# Patient Record
Sex: MALE | Race: WHITE | NOT HISPANIC OR LATINO | URBAN - METROPOLITAN AREA
[De-identification: names, ages, dates, MRNs, and addresses within clinical notes are randomized per-mention and may not be internally consistent; named-entity substitution may affect disease eponyms.]

---

## 2017-08-19 ENCOUNTER — EMERGENCY (EMERGENCY)
Facility: HOSPITAL | Age: 52
LOS: 1 days | Discharge: HOME | End: 2017-08-19
Attending: EMERGENCY MEDICINE | Admitting: EMERGENCY MEDICINE
Payer: COMMERCIAL

## 2017-08-19 VITALS
DIASTOLIC BLOOD PRESSURE: 74 MMHG | SYSTOLIC BLOOD PRESSURE: 126 MMHG | HEART RATE: 91 BPM | RESPIRATION RATE: 12 BRPM | WEIGHT: 218.04 LBS | HEIGHT: 66 IN | OXYGEN SATURATION: 96 % | TEMPERATURE: 98 F

## 2017-08-19 VITALS
DIASTOLIC BLOOD PRESSURE: 71 MMHG | SYSTOLIC BLOOD PRESSURE: 149 MMHG | HEART RATE: 84 BPM | TEMPERATURE: 99 F | OXYGEN SATURATION: 100 % | RESPIRATION RATE: 14 BRPM

## 2017-08-19 DIAGNOSIS — M25.562 PAIN IN LEFT KNEE: ICD-10-CM

## 2017-08-19 DIAGNOSIS — Z87.891 PERSONAL HISTORY OF NICOTINE DEPENDENCE: ICD-10-CM

## 2017-08-19 DIAGNOSIS — Z96.652 PRESENCE OF LEFT ARTIFICIAL KNEE JOINT: ICD-10-CM

## 2017-08-19 DIAGNOSIS — Z96.659 PRESENCE OF UNSPECIFIED ARTIFICIAL KNEE JOINT: Chronic | ICD-10-CM

## 2017-08-19 DIAGNOSIS — T84.023A INSTABILITY OF INTERNAL LEFT KNEE PROSTHESIS, INITIAL ENCOUNTER: ICD-10-CM

## 2017-08-19 DIAGNOSIS — Z98.89 OTHER SPECIFIED POSTPROCEDURAL STATES: Chronic | ICD-10-CM

## 2017-08-19 DIAGNOSIS — S53.105A UNSPECIFIED DISLOCATION OF LEFT ULNOHUMERAL JOINT, INITIAL ENCOUNTER: Chronic | ICD-10-CM
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
## 2017-08-19 DIAGNOSIS — S01.511A LACERATION WITHOUT FOREIGN BODY OF LIP, INITIAL ENCOUNTER: Chronic | ICD-10-CM

## 2017-08-19 PROCEDURE — 73562 X-RAY EXAM OF KNEE 3: CPT | Mod: 26,77,LT

## 2017-08-19 PROCEDURE — 73562 X-RAY EXAM OF KNEE 3: CPT | Mod: 26,LT

## 2017-08-19 PROCEDURE — 73562 X-RAY EXAM OF KNEE 3: CPT

## 2017-08-19 PROCEDURE — 99285 EMERGENCY DEPT VISIT HI MDM: CPT | Mod: 25

## 2017-08-19 PROCEDURE — 99156 MOD SED OTH PHYS/QHP 5/>YRS: CPT | Mod: XU

## 2017-08-19 PROCEDURE — 27560 TREAT KNEECAP DISLOCATION: CPT | Mod: LT

## 2017-08-19 PROCEDURE — 99156 MOD SED OTH PHYS/QHP 5/>YRS: CPT

## 2017-08-19 RX ORDER — IBUPROFEN 200 MG
1 TABLET ORAL
Qty: 20 | Refills: 0 | OUTPATIENT
Start: 2017-08-19 | End: 2017-08-24

## 2017-08-19 RX ORDER — SODIUM CHLORIDE 9 MG/ML
3 INJECTION INTRAMUSCULAR; INTRAVENOUS; SUBCUTANEOUS ONCE
Qty: 0 | Refills: 0 | Status: COMPLETED | OUTPATIENT
Start: 2017-08-19 | End: 2017-08-19

## 2017-08-19 RX ORDER — PROPOFOL 10 MG/ML
150 INJECTION, EMULSION INTRAVENOUS ONCE
Qty: 0 | Refills: 0 | Status: DISCONTINUED | OUTPATIENT
Start: 2017-08-19 | End: 2017-08-19

## 2017-08-19 RX ORDER — OXYCODONE AND ACETAMINOPHEN 5; 325 MG/1; MG/1
1 TABLET ORAL ONCE
Qty: 0 | Refills: 0 | Status: DISCONTINUED | OUTPATIENT
Start: 2017-08-19 | End: 2017-08-19

## 2017-08-19 RX ORDER — SODIUM CHLORIDE 9 MG/ML
1000 INJECTION INTRAMUSCULAR; INTRAVENOUS; SUBCUTANEOUS ONCE
Qty: 0 | Refills: 0 | Status: COMPLETED | OUTPATIENT
Start: 2017-08-19 | End: 2017-08-19

## 2017-08-19 RX ORDER — PROPOFOL 10 MG/ML
200 INJECTION, EMULSION INTRAVENOUS ONCE
Qty: 0 | Refills: 0 | Status: COMPLETED | OUTPATIENT
Start: 2017-08-19 | End: 2017-08-19

## 2017-08-19 RX ADMIN — SODIUM CHLORIDE 3 MILLILITER(S): 9 INJECTION INTRAMUSCULAR; INTRAVENOUS; SUBCUTANEOUS at 17:14

## 2017-08-19 RX ADMIN — SODIUM CHLORIDE 1000 MILLILITER(S): 9 INJECTION INTRAMUSCULAR; INTRAVENOUS; SUBCUTANEOUS at 17:45

## 2017-08-19 RX ADMIN — PROPOFOL 200 MILLIGRAM(S): 10 INJECTION, EMULSION INTRAVENOUS at 17:48

## 2017-08-19 NOTE — ED PROVIDER NOTE - OBJECTIVE STATEMENT
Pt is a 52 yo male who presents to the ED with a cc of left knee pain.  Pt reports that he has had several previous surgeries on his left knee.  He had an initial knee replacement in 2015 and subsequent to that has torn his patellar tendon.  They had to preform several repairs and eventually pt developed a joint infection ultimately requiring a revision in 11/2016.  Pt reports that since then he has been recovering well.  He had his surgery with Dr. Desir at [facility].  Today pt reports that he was in [employer] trying on sneakers.  He bent over to remove his left shoe.  While tugging on the shoe to remove it he pulled on his leg and felt a pop in the medial aspect of his knee.  Immediately after he experienced pain and swelling.  Pt reports that since then he has been unable to extend his knee.  He called Dr. Desir's office and was instructed to go to the hospital for x-rays and then call him back.   Denies numbness in his leg.  Denies CP, SOB, abd pain.  Pt did not suffer a fall during this event

## 2017-08-19 NOTE — ED PROVIDER NOTE - PROGRESS NOTE DETAILS
Knee dislocation noted, pt neurovascularly intact +pedal pulses.  Call left with Dr. Desir's service Spoke with Pallavi Desir's NP will attempt reduction here. Orthopedic resident paged Spoke with resident currently in the OR will come and see pt Pt seen and examined by orthopedic resident.  Will attempt reduction at bedside with hematoma block Unsuccessful attempt at reduction at bedside will preform conscious sedation at bedside See procedure note for details Pt stable for discharge home.  To remain in knee immobilizer.  Will call his orthopedist on Monday.  Weight bearing as tolerated

## 2017-08-19 NOTE — ED ADULT NURSE NOTE - OBJECTIVE STATEMENT
Received the patient in the Er. Patient is alert and oriented. skin warm and dry. c/o pain to left knee. Patient is ambulatory.

## 2017-08-19 NOTE — CONSULT NOTE ADULT - SUBJECTIVE AND OBJECTIVE BOX
51yMale c/o L knee pain since this afternoon when trying on shoes.  Bent his leg and felt his left knee move out of place and was unable to ambulate. Patient states that they cannot walk because of pain. Patient denies numbness or tingling in the LLE. Patient denies hx of trauma. Patient denies fever/chills. Denies pain or injury elsewhere.  Unable to straighten knee on his own secondary to pain and mechanical block.    Has had 6 previous surgeries to this knee including a revision of a total knee, surgeon Dr. Gonzalez at [facility].    PMH:  Sleep apnea  History of right bundle branch block (RBBB)  Psoriasis    PSH:  Laceration of lip, complicated  Traumatic dislocation of left elbow  S/P knee replacement  S/P knee surgery    AH:    Meds: See med rec    T(C): 36.8 (08-19-17 @ 13:50)  HR: 91 (08-19-17 @ 13:50)  BP: 126/74 (08-19-17 @ 13:50)  RR: 12 (08-19-17 @ 13:50)  SpO2: 96% (08-19-17 @ 13:50)  Wt(kg): --    PE LLE:  Skin intact, + gross deformity, no erythema/warmth of knee, palpable defect knee, SILT L3-S1, +EHL/FHL/TA/Gastroc, no ttp hip/ankle with full/painless ROM, knee ROM limited 2/2 pain, , no ttp bony prominences knee, DP and PT +, soft compartments, no calf ttp.    Secondary Survey: No TTP over bony prominences, SILT, palpable pulses, full/painless range of motion, compartments soft    Imaging:  XR demonstrating a subluxation of a revision total knee.    Closed reduction was attempted several times without success.  Patient was then given 10cc of lidocaine injected into left knee and reduction was attempted again unsuccessfully.      Patient was given conscience sedation with propofol with subsequent successful reduction of the subluxed knee using traction.     Post-reduction imaging was obtained demonstrated a reduced left knee.     Patient was then placed in a knee immobilizer. NVI following procedure.

## 2017-08-19 NOTE — ED ADULT NURSE REASSESSMENT NOTE - NS ED NURSE REASSESS COMMENT FT1
conscious sedation is over.  patient is alert and oriented X4. sitting up in bed, talking to staff.  patient denies pain, sob, chest pain, dizziness, palpitations, tingling/ numbing in extremities.  positive b/l pedal pulses.  patient is able to move all extremities.  ortho placed knee immobilizer on affected left lower extremity.   respirations even and unlabored.   IV intact.

## 2017-08-19 NOTE — ED PROCEDURE NOTE - NS_POSTPROCCAREGUIDE_ED_ALL_ED
Patient is now fully awake, with vital signs and temperature stable, hydration is adequate, patients Vera’s  score is at baseline (or greater than 8), patient and escort has received  discharge education.

## 2017-08-19 NOTE — ED PROVIDER NOTE - MUSCULOSKELETAL, MLM
Left Knee: generalized anterior joint effusion noted left worse then right pt reports that he has a chronic left joint effusion but that the swelling to the right occurred after incident today no reproducible TTP old healed surgical scar no overlying cellulitis sensation intact pt able to flex knee but can only extend 20 degrees +pedal pulse

## 2017-08-19 NOTE — ED ADULT TRIAGE NOTE - CHIEF COMPLAINT QUOTE
patient with complain of left knee pain and immobility after removing shoe, pt with hx of b/l knee replacement. Left knee revision done on 11/2016.

## 2017-08-19 NOTE — ED ADULT NURSE REASSESSMENT NOTE - NS ED NURSE REASSESS COMMENT FT1
received patient from Jassi KAUR from [facility] received patient from Jassi KAUR from [email or web address].  patient cannot extend left knee. positive b/l pedal pulses noted.  patient denies pain, tingling/ numbing in lower extremities, sob, chest pain, dizziness.  patient is on continuos capnography reading, NSR on continuos cardiac monitor, 100% pulse ox continuously.  respirations even and unlabored.  pending ortho team.

## 2017-08-19 NOTE — ED PROVIDER NOTE - PLAN OF CARE
Return to the ED for any new or worsening symptoms  Take your medication as prescribed  Motrin per label instructions as needed for pain mild to moderate  Percocet per label instructions as needed for severe pain do not drive when taking this   Keep knee immobilizer in place at all times  Weight bearing as tolerated  Call your orthopedist on Monday to schedule follow up  Elevate leg to reduce swelling  Ice to affected knee to reduce pain and swelling on 20 min off 40 min as needed for pain   Advance activity as tolerated

## 2017-08-19 NOTE — ED PROVIDER NOTE - CARE PLAN
Principal Discharge DX:	Knee dislocation, left, initial encounter  Instructions for follow-up, activity and diet:	Return to the ED for any new or worsening symptoms  Take your medication as prescribed  Motrin per label instructions as needed for pain mild to moderate  Percocet per label instructions as needed for severe pain do not drive when taking this   Keep knee immobilizer in place at all times  Weight bearing as tolerated  Call your orthopedist on Monday to schedule follow up  Elevate leg to reduce swelling  Ice to affected knee to reduce pain and swelling on 20 min off 40 min as needed for pain   Advance activity as tolerated  Secondary Diagnosis:	Acute pain of left knee

## 2017-08-19 NOTE — PROCEDURE NOTE - NSPOSTCAREGUIDE_GEN_A_CORE
Understanding of care for injured extremity verbalized/Instructed patient/caregiver to follow-up with primary care physician/Verbal/written post procedure instructions were given to patient/caregiver/Elevate the injured extremity as instructed/Keep the cast/splint/dressing clean and dry

## 2017-08-19 NOTE — ED PROCEDURE NOTE - PROCEDURE ADDITIONAL DETAILS
Consent for procedure was obtained at bedside and copy placed in chart  A time out was preformed prior to procedure and the procedural check list was filled out and placed in the chart   Pt noted to be supine in bed.  Ambu bag in room, suction set up, pt attached to end tidal CO2, continuos end tidal CO2 and wave form capnography was used throughout the procedure and monitored.  Pt vitals were monitored in real time during the procedure  Pt received 1 liter of Normal saline during the procedure   A total of 1.5 mg/kg of Diprivan was used for sedation  Reduction of left knee preformed by orthopedics Dr. Arriaga   Pt tolerated procedure well there were no complications during the sedation

## 2017-08-19 NOTE — PROCEDURE NOTE - NSPERIPVASCEVA_GEN_A_CORE
post-application: responses intact/fingers/toes warm to touch/no paresthesia/pre-application: responses intact

## 2017-08-19 NOTE — ED PROVIDER NOTE - PSH
Laceration of lip, complicated  reconstruction  S/P knee replacement  left, repair of ligament after replacement  S/P knee surgery  left knee  Traumatic dislocation of left elbow  "vascular reconstruction"

## 2017-08-19 NOTE — CONSULT NOTE ADULT - ASSESSMENT
A/P: 51M with left subluxed revision total knee.  Pain control  DVT ppx, encourage ambulation  WBAT LLE in knee immobilizer  Rest/ice/elevation  Patient was instructed to follow-up with primary surgeon Dr. Gonzalez at [facility] -- call office for appointment  PT/OT  Discussed with attending and agrees with plan above  Ortho stable for DC

## 2017-10-08 ENCOUNTER — EMERGENCY (EMERGENCY)
Facility: HOSPITAL | Age: 52
LOS: 1 days | Discharge: HOME | End: 2017-10-08
Attending: EMERGENCY MEDICINE | Admitting: EMERGENCY MEDICINE
Payer: COMMERCIAL

## 2017-10-08 VITALS
HEART RATE: 82 BPM | TEMPERATURE: 98 F | RESPIRATION RATE: 20 BRPM | DIASTOLIC BLOOD PRESSURE: 77 MMHG | OXYGEN SATURATION: 95 % | SYSTOLIC BLOOD PRESSURE: 136 MMHG

## 2017-10-08 DIAGNOSIS — Z96.659 PRESENCE OF UNSPECIFIED ARTIFICIAL KNEE JOINT: Chronic | ICD-10-CM

## 2017-10-08 DIAGNOSIS — Z98.89 OTHER SPECIFIED POSTPROCEDURAL STATES: Chronic | ICD-10-CM

## 2017-10-08 DIAGNOSIS — S53.105A UNSPECIFIED DISLOCATION OF LEFT ULNOHUMERAL JOINT, INITIAL ENCOUNTER: Chronic | ICD-10-CM

## 2017-10-08 DIAGNOSIS — S01.511A LACERATION WITHOUT FOREIGN BODY OF LIP, INITIAL ENCOUNTER: Chronic | ICD-10-CM

## 2017-10-08 LAB
ALBUMIN SERPL ELPH-MCNC: 4.7 G/DL — SIGNIFICANT CHANGE UP (ref 3.3–5)
ALP SERPL-CCNC: 97 U/L — SIGNIFICANT CHANGE UP (ref 40–120)
ALT FLD-CCNC: 25 U/L RC — SIGNIFICANT CHANGE UP (ref 10–45)
ANION GAP SERPL CALC-SCNC: 14 MMOL/L — SIGNIFICANT CHANGE UP (ref 5–17)
APTT BLD: 31.4 SEC — SIGNIFICANT CHANGE UP (ref 27.5–37.4)
AST SERPL-CCNC: 23 U/L — SIGNIFICANT CHANGE UP (ref 10–40)
BASOPHILS # BLD AUTO: 0 K/UL — SIGNIFICANT CHANGE UP (ref 0–0.2)
BASOPHILS NFR BLD AUTO: 0.3 % — SIGNIFICANT CHANGE UP (ref 0–2)
BILIRUB SERPL-MCNC: 0.5 MG/DL — SIGNIFICANT CHANGE UP (ref 0.2–1.2)
BUN SERPL-MCNC: 18 MG/DL — SIGNIFICANT CHANGE UP (ref 7–23)
CALCIUM SERPL-MCNC: 9.8 MG/DL — SIGNIFICANT CHANGE UP (ref 8.4–10.5)
CHLORIDE SERPL-SCNC: 104 MMOL/L — SIGNIFICANT CHANGE UP (ref 96–108)
CO2 SERPL-SCNC: 22 MMOL/L — SIGNIFICANT CHANGE UP (ref 22–31)
CREAT SERPL-MCNC: 1.02 MG/DL — SIGNIFICANT CHANGE UP (ref 0.5–1.3)
EOSINOPHIL # BLD AUTO: 0.1 K/UL — SIGNIFICANT CHANGE UP (ref 0–0.5)
EOSINOPHIL NFR BLD AUTO: 0.6 % — SIGNIFICANT CHANGE UP (ref 0–6)
GLUCOSE SERPL-MCNC: 106 MG/DL — HIGH (ref 70–99)
HCT VFR BLD CALC: 44.9 % — SIGNIFICANT CHANGE UP (ref 39–50)
HGB BLD-MCNC: 15.6 G/DL — SIGNIFICANT CHANGE UP (ref 13–17)
INR BLD: 0.97 RATIO — SIGNIFICANT CHANGE UP (ref 0.88–1.16)
LYMPHOCYTES # BLD AUTO: 2.4 K/UL — SIGNIFICANT CHANGE UP (ref 1–3.3)
LYMPHOCYTES # BLD AUTO: 20 % — SIGNIFICANT CHANGE UP (ref 13–44)
MCHC RBC-ENTMCNC: 31.5 PG — SIGNIFICANT CHANGE UP (ref 27–34)
MCHC RBC-ENTMCNC: 34.7 GM/DL — SIGNIFICANT CHANGE UP (ref 32–36)
MCV RBC AUTO: 90.9 FL — SIGNIFICANT CHANGE UP (ref 80–100)
MONOCYTES # BLD AUTO: 0.6 K/UL — SIGNIFICANT CHANGE UP (ref 0–0.9)
MONOCYTES NFR BLD AUTO: 5.5 % — SIGNIFICANT CHANGE UP (ref 2–14)
NEUTROPHILS # BLD AUTO: 8.7 K/UL — HIGH (ref 1.8–7.4)
NEUTROPHILS NFR BLD AUTO: 73.7 % — SIGNIFICANT CHANGE UP (ref 43–77)
PLATELET # BLD AUTO: 300 K/UL — SIGNIFICANT CHANGE UP (ref 150–400)
POTASSIUM SERPL-MCNC: 4.1 MMOL/L — SIGNIFICANT CHANGE UP (ref 3.5–5.3)
POTASSIUM SERPL-SCNC: 4.1 MMOL/L — SIGNIFICANT CHANGE UP (ref 3.5–5.3)
PROT SERPL-MCNC: 8.2 G/DL — SIGNIFICANT CHANGE UP (ref 6–8.3)
PROTHROM AB SERPL-ACNC: 10.6 SEC — SIGNIFICANT CHANGE UP (ref 9.8–12.7)
RBC # BLD: 4.94 M/UL — SIGNIFICANT CHANGE UP (ref 4.2–5.8)
RBC # FLD: 12.5 % — SIGNIFICANT CHANGE UP (ref 10.3–14.5)
SODIUM SERPL-SCNC: 140 MMOL/L — SIGNIFICANT CHANGE UP (ref 135–145)
WBC # BLD: 11.9 K/UL — HIGH (ref 3.8–10.5)
WBC # FLD AUTO: 11.9 K/UL — HIGH (ref 3.8–10.5)

## 2017-10-08 PROCEDURE — 73562 X-RAY EXAM OF KNEE 3: CPT | Mod: 26,LT

## 2017-10-08 PROCEDURE — 73700 CT LOWER EXTREMITY W/O DYE: CPT | Mod: 26,LT

## 2017-10-08 PROCEDURE — 99156 MOD SED OTH PHYS/QHP 5/>YRS: CPT

## 2017-10-08 PROCEDURE — 76377 3D RENDER W/INTRP POSTPROCES: CPT | Mod: 26

## 2017-10-08 PROCEDURE — 99285 EMERGENCY DEPT VISIT HI MDM: CPT | Mod: 25

## 2017-10-08 NOTE — ED ADULT NURSE NOTE - OBJECTIVE STATEMENT
pt c/o "went to straighten my rt knee out while sitting in chair and I heard a pop. now unable to straighten my knee at all. no pain unless try to straighten out."  Left knee with gross swelling ( normal size per pt but seems like just below my knee is a little larger than usual), neurovascular check to LLE intact with no deficits

## 2017-10-08 NOTE — ED PROVIDER NOTE - PROGRESS NOTE DETAILS
attending Soledad: L knee dislocation reduced by ortho. Pt tolerated procedure well. Post reduction films done. CTA LLE pending. Attending MD Reina: Patient received on sign out from Dr. Rowe pending CTA of L knee.  Discussed with radiology, prelim read now, no final read overnight.  No vessel injury noted.  Discussed with patient that prelim read now but final read not until morning.  Can be reached at [phone] on his cell for any acute findings.  Discussed with ortho.  Cleared for discharge from their standpoint.  Able to ambulate. Follow up instructions given to follow up with Dr. Desir in 24-48hrs, importance of follow up emphasized, return to ED parameters reviewed and patient verbalized understanding.  All questions answered, all concerns addressed. Attending MD Reina: CTA read reviewed, no evidence of popliteal a injury

## 2017-10-08 NOTE — ED PROVIDER NOTE - OBJECTIVE STATEMENT
Pt is a 51M with a PMH of b/l knee prostheses s/p multiple L revisions, septic joint, x1 dislocation requiring procedural sedation presenting with cc of L sided knee  injury. Per pt reports sitting at chair at house with knee held in flexion leaned forward and felt popping sensation c/w prior episode of dislocation, notes pain only with movement, most comfortable position is knee at 90 degree flexion. Unable to ambulate at scene or in ED. Denies numbness, tingling, loss of sensation or loss of motor function. Since incident, denies n/v/f/c/cp/sob. Denies headache, syncope, lightheadedness, dizziness. Denies chest palpitations, abdominal pain. Denies dysuria, hematuria, hematochezia, BRBPR, tarry stools, diarrhea, constipation.

## 2017-10-08 NOTE — ED PROVIDER NOTE - ATTENDING CONTRIBUTION TO CARE
attending Soledad: 51yM h/o b/l knee replacements with multiple revisions presents with L knee dislocation. Reports sitting in chair with knee held in flexion leaned forward and felt popping sensation c/w prior episode of dislocation. Unable to ambulate. On exam, NAD, knee supported in flexion, pain with attempt at full extension, 2+ DP and popliteal pulses bilaterally, Will obtain xrays, ortho consult and sedation for knee relocation, CTA LE eval for popliteal injury and reassess.

## 2017-10-08 NOTE — ED PROVIDER NOTE - MEDICAL DECISION MAKING DETAILS
51M with multiple prior L knee surgeries, s/p x1 dislocation s/p prosthesis presenting with cc of knee popping c/f possible re-dislocation, denies trauma fall, ant/post ligament instability noted on exam, large effusion at baseline, will get plain films, cta r/o popliteal injury.

## 2017-10-08 NOTE — CHART NOTE - NSCHARTNOTEFT_GEN_A_CORE
52 yo male presents c/o left knee dislocation. sp multiple left knee surgeries including left revision tka last year by dr abdalla at [facility]. states he has had his knee dislocate once prior, reduced at [place] ED with conscious sedation. states today he was sitting at the table with his knee flexed and felt it pop out of place. denies fall/injury. denies cp/sob/numbness/tingling. no other complaints at this time.     HEALTH ISSUES - PROBLEM Dx:        MEDICATIONS  (STANDING):    Allergies    rifampin (Urticaria)    Intolerances                            15.6   11.9  )-----------( 300      ( 08 Oct 2017 21:56 )             44.9     08 Oct 2017 21:56    140    |  104    |  18     ----------------------------<  106    4.1     |  22     |  1.02     Ca    9.8        08 Oct 2017 21:56    TPro  8.2    /  Alb  4.7    /  TBili  0.5    /  DBili  x      /  AST  23     /  ALT  25     /  AlkPhos  97     08 Oct 2017 21:56    PT/INR - ( 08 Oct 2017 22:03 )   PT: 10.6 sec;   INR: 0.97 ratio         PTT - ( 08 Oct 2017 22:03 )  PTT:31.4 sec  Vital Signs Last 24 Hrs  T(C): 36.8 (10-08-17 @ 20:33), Max: 36.8 (10-08-17 @ 20:33)  T(F): 98.3 (10-08-17 @ 20:33), Max: 98.3 (10-08-17 @ 20:33)  HR: 79 (10-08-17 @ 21:23) (79 - 82)  BP: 136/78 (10-08-17 @ 21:23) (136/77 - 136/78)  BP(mean): --  RR: 20 (10-08-17 @ 21:23) (20 - 20)  SpO2: 98% (10-08-17 @ 21:23) (95% - 98%)    xr: left tka subluxation    Physical Exam  Gen: NAD  LLE:   well healed midline surgical scar at anterior knee  minimal TTP about knee  AROM -30-90, PROM same  +knee effusion  no warmth/erythema  +ehl/fhl/ta/gs function  L2-S1 silt  Dp/pt pulse intact  No calf ttp  Compartments soft    Procedure: under conscious sedation, closed reduction of left knee performed. placed in knee immobilizer, post procedure exam unchanged, nv intact. post procedure xrays obtained demonstrate adequate alignment.     A/P: 51y Male w left TKA dislocation  Pain control  WBAT in knee immobilizer, no knee flexion  Ice/elevate  follow up with own orthopedist as outpatient within 1 week, call for appointment  if would like to follow up with other orthopedist, follow up with dr padron as outpatient call for appointment 50 yo male presents c/o left knee dislocation. sp multiple left knee surgeries including left revision tka last year by dr abdalla at [facility]. states he has had his knee dislocate once prior, reduced at [place] ED with conscious sedation. states today he was sitting at the table with his knee flexed and felt it pop out of place. denies fall/injury. denies cp/sob/numbness/tingling. no other complaints at this time.     HEALTH ISSUES - PROBLEM Dx:        MEDICATIONS  (STANDING):    Allergies    rifampin (Urticaria)    Intolerances                            15.6   11.9  )-----------( 300      ( 08 Oct 2017 21:56 )             44.9     08 Oct 2017 21:56    140    |  104    |  18     ----------------------------<  106    4.1     |  22     |  1.02     Ca    9.8        08 Oct 2017 21:56    TPro  8.2    /  Alb  4.7    /  TBili  0.5    /  DBili  x      /  AST  23     /  ALT  25     /  AlkPhos  97     08 Oct 2017 21:56    PT/INR - ( 08 Oct 2017 22:03 )   PT: 10.6 sec;   INR: 0.97 ratio         PTT - ( 08 Oct 2017 22:03 )  PTT:31.4 sec  Vital Signs Last 24 Hrs  T(C): 36.8 (10-08-17 @ 20:33), Max: 36.8 (10-08-17 @ 20:33)  T(F): 98.3 (10-08-17 @ 20:33), Max: 98.3 (10-08-17 @ 20:33)  HR: 79 (10-08-17 @ 21:23) (79 - 82)  BP: 136/78 (10-08-17 @ 21:23) (136/77 - 136/78)  BP(mean): --  RR: 20 (10-08-17 @ 21:23) (20 - 20)  SpO2: 98% (10-08-17 @ 21:23) (95% - 98%)    xr: left tka subluxation    Physical Exam  Gen: NAD  LLE:   well healed midline surgical scar at anterior knee  minimal TTP about knee  AROM -30-90, PROM same  +knee effusion  no warmth/erythema  +ehl/fhl/ta/gs function  L2-S1 silt  Dp/pt pulse intact  No calf ttp  Compartments soft    Procedure: under conscious sedation, closed reduction of left knee performed. placed in knee immobilizer, post procedure exam unchanged, nv intact, 2+ dp/pt pulses. post procedure xrays obtained demonstrate adequate alignment.     A/P: 51y Male w left TKA dislocation  Pain control  WBAT in knee immobilizer, no knee flexion  Ice/elevate  follow up with own orthopedist as outpatient within 1 week, call for appointment  if would like to follow up with other orthopedist, follow up with dr padron as outpatient call for appointment

## 2017-10-09 VITALS
HEART RATE: 86 BPM | SYSTOLIC BLOOD PRESSURE: 136 MMHG | DIASTOLIC BLOOD PRESSURE: 78 MMHG | OXYGEN SATURATION: 96 % | TEMPERATURE: 98 F | RESPIRATION RATE: 18 BRPM

## 2017-10-09 PROCEDURE — 96374 THER/PROPH/DIAG INJ IV PUSH: CPT | Mod: XU

## 2017-10-09 PROCEDURE — 73562 X-RAY EXAM OF KNEE 3: CPT

## 2017-10-09 PROCEDURE — 73700 CT LOWER EXTREMITY W/O DYE: CPT

## 2017-10-09 PROCEDURE — 76377 3D RENDER W/INTRP POSTPROCES: CPT

## 2017-10-09 PROCEDURE — 73560 X-RAY EXAM OF KNEE 1 OR 2: CPT

## 2017-10-09 PROCEDURE — 85610 PROTHROMBIN TIME: CPT

## 2017-10-09 PROCEDURE — 73706 CT ANGIO LWR EXTR W/O&W/DYE: CPT | Mod: 26,LT

## 2017-10-09 PROCEDURE — 96375 TX/PRO/DX INJ NEW DRUG ADDON: CPT | Mod: XU

## 2017-10-09 PROCEDURE — 85730 THROMBOPLASTIN TIME PARTIAL: CPT

## 2017-10-09 PROCEDURE — 85027 COMPLETE CBC AUTOMATED: CPT

## 2017-10-09 PROCEDURE — 27550 TREAT KNEE DISLOCATION: CPT

## 2017-10-09 PROCEDURE — 80053 COMPREHEN METABOLIC PANEL: CPT

## 2017-10-09 PROCEDURE — 73706 CT ANGIO LWR EXTR W/O&W/DYE: CPT

## 2017-10-09 PROCEDURE — 99156 MOD SED OTH PHYS/QHP 5/>YRS: CPT

## 2017-10-09 PROCEDURE — 99284 EMERGENCY DEPT VISIT MOD MDM: CPT | Mod: 25

## 2017-10-09 PROCEDURE — 73560 X-RAY EXAM OF KNEE 1 OR 2: CPT | Mod: 26,59,LT

## 2017-10-09 RX ORDER — MIDAZOLAM HYDROCHLORIDE 1 MG/ML
2 INJECTION, SOLUTION INTRAMUSCULAR; INTRAVENOUS ONCE
Qty: 0 | Refills: 0 | Status: DISCONTINUED | OUTPATIENT
Start: 2017-10-09 | End: 2017-10-09

## 2017-10-09 RX ORDER — SODIUM CHLORIDE 9 MG/ML
1000 INJECTION INTRAMUSCULAR; INTRAVENOUS; SUBCUTANEOUS ONCE
Qty: 0 | Refills: 0 | Status: COMPLETED | OUTPATIENT
Start: 2017-10-09 | End: 2017-10-09

## 2017-10-09 RX ORDER — FENTANYL CITRATE 50 UG/ML
25 INJECTION INTRAVENOUS ONCE
Qty: 0 | Refills: 0 | Status: DISCONTINUED | OUTPATIENT
Start: 2017-10-09 | End: 2017-10-09

## 2017-10-09 RX ADMIN — FENTANYL CITRATE 25 MICROGRAM(S): 50 INJECTION INTRAVENOUS at 00:15

## 2017-10-09 RX ADMIN — MIDAZOLAM HYDROCHLORIDE 2 MILLIGRAM(S): 1 INJECTION, SOLUTION INTRAMUSCULAR; INTRAVENOUS at 00:21

## 2017-10-09 RX ADMIN — MIDAZOLAM HYDROCHLORIDE 2 MILLIGRAM(S): 1 INJECTION, SOLUTION INTRAMUSCULAR; INTRAVENOUS at 00:25

## 2017-10-09 RX ADMIN — FENTANYL CITRATE 25 MICROGRAM(S): 50 INJECTION INTRAVENOUS at 00:18

## 2017-10-09 RX ADMIN — FENTANYL CITRATE 25 MICROGRAM(S): 50 INJECTION INTRAVENOUS at 00:49

## 2017-10-09 RX ADMIN — MIDAZOLAM HYDROCHLORIDE 2 MILLIGRAM(S): 1 INJECTION, SOLUTION INTRAMUSCULAR; INTRAVENOUS at 00:15

## 2017-10-09 RX ADMIN — FENTANYL CITRATE 25 MICROGRAM(S): 50 INJECTION INTRAVENOUS at 00:50

## 2017-10-09 RX ADMIN — SODIUM CHLORIDE 1000 MILLILITER(S): 9 INJECTION INTRAMUSCULAR; INTRAVENOUS; SUBCUTANEOUS at 00:49

## 2017-10-09 NOTE — ED ADULT NURSE REASSESSMENT NOTE - NS ED NURSE REASSESS COMMENT FT1
0230 am patient able to walk well w/ knee immobilizer, independently, Fully alert, clothes  and cane provided, full dress, waiting x discharge.
pt prepared for conscious sedation for left knee replacement/reduction- see paper conscious sedation flow sheet
pt comfortable, knee immobilizer in place to left knee as placed by ortho post conscious sedation.

## 2017-12-16 ENCOUNTER — EMERGENCY (EMERGENCY)
Facility: HOSPITAL | Age: 52
LOS: 1 days | Discharge: HOME | End: 2017-12-16
Attending: EMERGENCY MEDICINE | Admitting: EMERGENCY MEDICINE
Payer: COMMERCIAL

## 2017-12-16 VITALS
SYSTOLIC BLOOD PRESSURE: 134 MMHG | OXYGEN SATURATION: 96 % | HEART RATE: 92 BPM | TEMPERATURE: 98 F | RESPIRATION RATE: 14 BRPM | DIASTOLIC BLOOD PRESSURE: 80 MMHG

## 2017-12-16 VITALS
SYSTOLIC BLOOD PRESSURE: 144 MMHG | WEIGHT: 220.02 LBS | OXYGEN SATURATION: 97 % | RESPIRATION RATE: 15 BRPM | DIASTOLIC BLOOD PRESSURE: 77 MMHG | TEMPERATURE: 98 F | HEART RATE: 89 BPM

## 2017-12-16 DIAGNOSIS — Z96.659 PRESENCE OF UNSPECIFIED ARTIFICIAL KNEE JOINT: Chronic | ICD-10-CM

## 2017-12-16 DIAGNOSIS — S53.105A UNSPECIFIED DISLOCATION OF LEFT ULNOHUMERAL JOINT, INITIAL ENCOUNTER: Chronic | ICD-10-CM

## 2017-12-16 DIAGNOSIS — Z98.89 OTHER SPECIFIED POSTPROCEDURAL STATES: Chronic | ICD-10-CM

## 2017-12-16 DIAGNOSIS — S01.511A LACERATION WITHOUT FOREIGN BODY OF LIP, INITIAL ENCOUNTER: Chronic | ICD-10-CM

## 2017-12-16 PROCEDURE — 99284 EMERGENCY DEPT VISIT MOD MDM: CPT | Mod: 25

## 2017-12-16 PROCEDURE — 99283 EMERGENCY DEPT VISIT LOW MDM: CPT | Mod: 25

## 2017-12-16 PROCEDURE — 73560 X-RAY EXAM OF KNEE 1 OR 2: CPT | Mod: 26,59,LT

## 2017-12-16 PROCEDURE — 73562 X-RAY EXAM OF KNEE 3: CPT

## 2017-12-16 PROCEDURE — 96374 THER/PROPH/DIAG INJ IV PUSH: CPT | Mod: 59

## 2017-12-16 PROCEDURE — 73562 X-RAY EXAM OF KNEE 3: CPT | Mod: 26,LT

## 2017-12-16 PROCEDURE — 73560 X-RAY EXAM OF KNEE 1 OR 2: CPT

## 2017-12-16 PROCEDURE — 27550 TREAT KNEE DISLOCATION: CPT

## 2017-12-16 PROCEDURE — 27550 TREAT KNEE DISLOCATION: CPT | Mod: LT

## 2017-12-16 RX ORDER — ONDANSETRON 8 MG/1
4 TABLET, FILM COATED ORAL ONCE
Qty: 0 | Refills: 0 | Status: COMPLETED | OUTPATIENT
Start: 2017-12-16 | End: 2017-12-16

## 2017-12-16 RX ORDER — MONTELUKAST 4 MG/1
0 TABLET, CHEWABLE ORAL
Qty: 0 | Refills: 0 | COMMUNITY

## 2017-12-16 RX ORDER — MORPHINE SULFATE 50 MG/1
4 CAPSULE, EXTENDED RELEASE ORAL ONCE
Qty: 0 | Refills: 0 | Status: DISCONTINUED | OUTPATIENT
Start: 2017-12-16 | End: 2017-12-16

## 2017-12-16 NOTE — ED ADULT NURSE NOTE - OBJECTIVE STATEMENT
Pt A&Ox4, ambulatory to ED c/o left knee injury.  Pt states he has a prosthetic knee and has had multiple instances of dislocation due to problems with tendon. Pt is scheduled for surgery to fix tendon on 12/29/2017.  Pt states he was sitting eating breakfast when he moved left leg and "felt it pop."  Pt has no pain at rest, but is unable to fully straighten left leg and has pain with weight bearing.  Positive pedal pulse.

## 2017-12-16 NOTE — CONSULT NOTE ADULT - SUBJECTIVE AND OBJECTIVE BOX
52M presented to [facility] c/o L TKA dislocation after trying to stand up while eating breakfast. This is his 4th TKA dislocation. Denies HS/LOC. Denies numbness, tingling, paresthesia. Denies any other orthopedic injuries at this time.    VS: AVSS    PMHx: RBBB, Psoriasis, FRANCO  PSHx: L TKA & 2 revisions, R TKA, L Elbow dx w/ vascular recon  Meds: See Med Rec  All: Rifampin  Imaging: XR L Knee - TKA dislocation with hardware in appropriate position.     LLE: +gross deformity, skin itnact, incision well healed, TA/EHL/FHL/GS intact, SILT L3-S1, +DP, CR Brisk, no ttp ankle/hip, ROM limited 2/2 dislocation and pain in knee    Secondary Survey: no ttp bony prominences, SILT, palpable pulses, full.painless ROM    Procedure: 10 IV Valium per ED provider given 2/2 PO status and subsequent delay in ability to administer conscious sedation. Knee reduced using traction. Post reduction Xr demonstrate knee reduced in appropriate position with hardware in appropriate position. Pt tolerated procedure well. NVI following procedure.

## 2017-12-16 NOTE — ED PROVIDER NOTE - OBJECTIVE STATEMENT
pt with hx left knee replacement and mult dislocations of prosthesis c/o sudden sharp left knee pain  c/w prior dislocations when crossed his legs underchair while eating at table. denies any other injury.  ortho - [place]

## 2017-12-16 NOTE — CONSULT NOTE ADULT - ASSESSMENT
A/P: 52M with L TKA dislocation  pain control  WBAT in knee immobilizer at all times  FU with treating orthopedist this week  Pt stated he is scheduled for revision surgery 12/29  No further orthopedic intervention at this time  Rest, ice, elevate  will d/w ortho attending and advise if plan changes.

## 2018-05-29 ENCOUNTER — EMERGENCY (EMERGENCY)
Facility: HOSPITAL | Age: 53
LOS: 1 days | Discharge: HOME | End: 2018-05-29
Attending: EMERGENCY MEDICINE | Admitting: EMERGENCY MEDICINE
Payer: SELF-PAY

## 2018-05-29 VITALS
TEMPERATURE: 99 F | SYSTOLIC BLOOD PRESSURE: 142 MMHG | HEART RATE: 92 BPM | HEIGHT: 66 IN | WEIGHT: 229.94 LBS | RESPIRATION RATE: 18 BRPM | OXYGEN SATURATION: 95 % | DIASTOLIC BLOOD PRESSURE: 83 MMHG

## 2018-05-29 DIAGNOSIS — Z96.659 PRESENCE OF UNSPECIFIED ARTIFICIAL KNEE JOINT: Chronic | ICD-10-CM

## 2018-05-29 DIAGNOSIS — Z98.89 OTHER SPECIFIED POSTPROCEDURAL STATES: Chronic | ICD-10-CM

## 2018-05-29 DIAGNOSIS — S53.105A UNSPECIFIED DISLOCATION OF LEFT ULNOHUMERAL JOINT, INITIAL ENCOUNTER: Chronic | ICD-10-CM

## 2018-05-29 DIAGNOSIS — S01.511A LACERATION WITHOUT FOREIGN BODY OF LIP, INITIAL ENCOUNTER: Chronic | ICD-10-CM

## 2018-05-29 PROCEDURE — 99283 EMERGENCY DEPT VISIT LOW MDM: CPT

## 2018-05-29 PROCEDURE — 73610 X-RAY EXAM OF ANKLE: CPT

## 2018-05-29 PROCEDURE — 73610 X-RAY EXAM OF ANKLE: CPT | Mod: 26,RT

## 2018-05-29 RX ORDER — ACETAMINOPHEN 500 MG
650 TABLET ORAL ONCE
Qty: 0 | Refills: 0 | Status: COMPLETED | OUTPATIENT
Start: 2018-05-29 | End: 2018-05-29

## 2018-05-29 RX ADMIN — Medication 650 MILLIGRAM(S): at 16:43
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
## 2018-05-29 NOTE — ED PROVIDER NOTE - PHYSICAL EXAMINATION
***GEN - well appearing; NAD   ***HEAD - NC/AT  ***EYES/NOSE - PEERL, EOMI, mucous membranes moist, no discharge   ***THROAT: Oral cavity and pharynx normal. No inflammation, swelling, exudate, or lesions.    ***NECK: supple, non-tender no lymphadenopathy  ***PULMONARY - CTA b/l, symmetric breath sounds.   ***CARDIAC- s1s2, RRR, no murmur  ***ABDOMEN - +BS, ND, NT, soft, no guarding, no rebound, no organomegaly  ***BACK - no CVA tenderness, Normal  spine, no spinal TTP  ***EXTREMITIES - symmetric pulses, 2+ dp, capillary refill < 2 seconds, no clubbing, no cyanosis, no edema.  Mild tenderness to palpation R lateral ankle.   ***SKIN - warm, dry, intact, no rash or bruising   ***NEUROLOGIC - a&o x3, CN 3-12 intact, sensation nl, motor 5/5 RUE/LUE/RLE/LLE gait nl
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
## 2018-05-29 NOTE — ED PROVIDER NOTE - NS ED ROS FT
Constitutional: No fever or chills  Eyes: No visual changes  HEENT: No throat pain  CV: No chest pain  Resp: No SOB no cough  GI: No abd pain, nausea or vomiting  : No dysuria  MSK: hpi  Skin: No rash  Neuro: No headache
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
## 2020-08-14 NOTE — ED PROVIDER NOTE - MUSCULOSKELETAL MINIMAL EXAM
Health Maintenance Due   Topic Date Due   • Shingles Vaccine (1 of 2) 12/25/2018   • Breast Cancer Screening  01/18/2020       Patient is due for topics as listed above but is not proceeding with  at this time.     Recent PHQ 2/9 Score    PHQ 2:  Date Adult PHQ 2 Score Adult PHQ 2 Interpretation   8/14/2020 1 No further screening needed       PHQ 9:                TENDERNESS/RANGE OF MOTION LIMITED
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
## 2022-06-16 NOTE — ED ADULT NURSE REASSESSMENT NOTE - NS ED NURSE REASSESS COMMENT FT1
pt aox3, no n/v, no sob, no changes in CM, VS wnl, lt knee immobilizer on place by ortho, x rays done
pt aox3, seen by ortho resident Dr Valerio, lt knee, dislocation redoction done, pre medicated with valium iv, CM RSR, VS wnl, pulse ox 96 %
Pt declines morphine at the time; states "they can probably just put it in with morphine instead of knocking me out."  Pt to have xray then further treatment to be determined.
normal...
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
## 2022-11-21 NOTE — ED PROVIDER NOTE - MUSCULOSKELETAL MINIMAL EXAM
INR at goal. Medications and chart reviewed. No changes noted to necessitate adjustment of warfarin or follow-up plan. See calendar.     atraumatic

## 2023-07-13 NOTE — ED ADULT NURSE NOTE - PERIPHERAL VASCULAR
58 y/o F w/ PMH of SAH s/p brain aneurysm clipping x 2, Na fluctuations 2/2 DI, seizures was admitted for acute metabolic encephalopathy 2/2 hypernatremia.  CTH (-) for acute findings.  Nephro and endo were consulted.  Pt was started on hypotonic saline initially.  At home she is managed w/ DDAVP and strict fluid control. During hospital course she became hyponatremic and it was corrected w/ NaCl tabs and hypertonic saline.  Mental status back to baseline.  She has developed Hypernatremia now, NaCl tabs d/c'd, desmopressin resumed. Pt encouraged to drink 8-10 oz of water every 3-4 hrs.      #Hypernatremia 2/2 DI which is likely sequale of SAH   - Need to continue encouraging pt to drink water q3-4h   - c/w Desmopressin (increased to 0.3 mg BID -- needs to take on empty stomach, not with meals)  - Family requested for PICC on d/c -- per discussion w/ nephrology pt had line infection in the past from prior picc and is inappropriate given pt able to take po just needs to be reminded  - Pt does need in person supervision to ensure PO hydration at home; either family vs aides to supervise  -  to bring in record from prior PICC line placement to see which outpt provider was taking care of the PICC  - Nephrology following and recs noted   - Na+ up to 151, on 1/4NS x 4 hours, BMP in AM    #Hyperkalemia  - resolved     #Seizures  - c/w Keppra    #HTN  - Started Amlodipine (increased to 10 mg daily)     VTE Prophylaxis -- Lovenox SQ    Dispo: pt family agreeing to long term care, pending seats and auth [facility]

## 2023-11-25 NOTE — ED PROVIDER NOTE - CONDUCTED A DETAILED DISCUSSION WITH PATIENT AND/OR GUARDIAN REGARDING, MDM
- - - radiology results/need for outpatient follow-up/return to ED if symptoms worsen, persist or questions arise

## 2024-07-30 NOTE — ED PROVIDER NOTE - EXITCARE/DISCHARGE INSTRUCTIONS
Launch [employer] and print the 'Prescriptions from this Visit' Report How Severe Are Your Spot(S)?: mild Have Your Spot(S) Been Treated In The Past?: has not been treated Hpi Title: Evaluation of Skin Lesions
